# Patient Record
Sex: FEMALE | Race: WHITE
[De-identification: names, ages, dates, MRNs, and addresses within clinical notes are randomized per-mention and may not be internally consistent; named-entity substitution may affect disease eponyms.]

---

## 2019-10-01 ENCOUNTER — HOSPITAL ENCOUNTER (EMERGENCY)
Dept: HOSPITAL 92 - ERS | Age: 34
Discharge: HOME | End: 2019-10-01
Payer: COMMERCIAL

## 2019-10-01 DIAGNOSIS — F17.210: ICD-10-CM

## 2019-10-01 DIAGNOSIS — M54.41: Primary | ICD-10-CM

## 2019-10-01 DIAGNOSIS — Z79.899: ICD-10-CM

## 2019-10-01 LAB
PREGU CONTROL BACKGROUND?: (no result)
PREGU CONTROL BAR APPEAR?: YES
RBC UR QL AUTO: (no result) HPF (ref 0–3)
WBC UR QL AUTO: (no result) HPF (ref 0–3)

## 2019-10-01 PROCEDURE — 96361 HYDRATE IV INFUSION ADD-ON: CPT

## 2019-10-01 PROCEDURE — 81003 URINALYSIS AUTO W/O SCOPE: CPT

## 2019-10-01 PROCEDURE — 96374 THER/PROPH/DIAG INJ IV PUSH: CPT

## 2019-10-01 PROCEDURE — 96376 TX/PRO/DX INJ SAME DRUG ADON: CPT

## 2019-10-01 PROCEDURE — 96375 TX/PRO/DX INJ NEW DRUG ADDON: CPT

## 2019-10-01 PROCEDURE — 74176 CT ABD & PELVIS W/O CONTRAST: CPT

## 2019-10-01 PROCEDURE — 81015 MICROSCOPIC EXAM OF URINE: CPT

## 2019-10-01 PROCEDURE — 72131 CT LUMBAR SPINE W/O DYE: CPT

## 2019-10-01 PROCEDURE — 81025 URINE PREGNANCY TEST: CPT

## 2019-10-01 NOTE — CT
PRELIMINARY REPORT/[facility]/EMERGENCY AFTER HOURS PROCEDURE:

 

PROCEDURE INFORMATION:

Exam: CT Abdomen and pelvis without contrast

 

Exam date and time: 10/1/2019 2:15 AM

Clinical history: 33 years old, female; Patient HX: F33 presents to ED with C/O lower back pain that 
radiates down her R leg, onset 1400 today with associated nausea. Patient has chronic back pain, but 
reports that this pain is different and it feels like something is pinched. Patient reports that the 
pain worsened when she went to lie down. Denies injury or falls. Patient has 2 rods in her back from 
surgery for scoliosis. Reports that she had a baby 4 months ago by vaginal delivery and was stuck for
 her epidural 9 times. Patient had an mri done 3 weeks ago and told she had degenerative disc disease
 for which she will need cortisol shots. Denies bowel or bladder incontinence. Denies vaginal bleedin
g and discharge. Denies fever, vomiting and abdominal pain

 

TECHNIQUE:

Imaging protocol: Computed tomography of the abdomen and pelvis without contrast.

 

COMPARISON:

No relevant prior studies available.

 

FINDINGS:

Liver: Normal. No mass.

Gallbladder and bile ducts: Normal. No calcified stones. No ductal dilation.

Pancreas: Normal. No ductal dilation.

Spleen: Normal. No splenomegaly.

Adrenals: Normal. No mass.

Kidneys and ureters: Normal. No hydronephrosis.

Stomach and bowel: There are prominent small bowel loops of the left upper quadrant with tapering. Th
is could be due to peristalsis. Please correlate.

Appendix: No evidence of appendicitis.

Intraperitoneal space: Unremarkable. No free air. No significant fluid collection.

Vasculature: Unremarkable. No abdominal aortic aneurysm.

Lymph nodes: Unremarkable. No enlarged lymph nodes.

Bladder: Unremarkable as visualized.

Reproductive: Unremarkable as visualized.

Bones/joints: Postoperative changes of the thoracolumbar spine with Cross rods for scoliosis.

There is scoliosis of the lumbar spine with convexity to the left.

Soft tissues: Bilateral breast implants.

 

IMPRESSION:

1. Prominent small bowel loops of left upper quadrant with tapering probably due to peristalsis. Plea
se correlate.

2. No acute appendicitis.

3. No renal stones or hydronephrosis.

 

Thank you for allowing us to participate in the care of your patient.

Dictated and Authenticated by: Shannan Kelley DO

10/01/2019 2:59 AM Central Time ([place] & [place])

 

 

 

FINAL REPORT 

 

CT ABDOMEN AND PELVIS NONCONTRAST:

 

DATE: 10/1/2019.

TIME: Performed on an emergency basis at 0216 hours.

 

HISTORY: 

Left flank pain.

 

FINDINGS: 

No comparison.  Agree with the preliminary report by Dr. Kelley from [facility].  There is no 
CT evidence of urinary tract obstruction or calcification.  Lack of contrast limits evaluation for ot
her abnormalities.  Gas and fluid-distended loops of proximal small bowel are noted but are of doubtf
ul clinical significance.  Overall appearance is not suggestive of significant obstruction.

 

Detailed limited somewhat due to extensive metallic spray artifact from posterior spinal rods.

 

POS: [facility]

## 2019-10-01 NOTE — CT
PRELIMINARY REPORT/[facility]/EMERGENCY AFTER

HOURS PROCEDURE: 

 

PROCEDURE INFORMATION:

Exam: CT Lumbar Spine Without Contrast

 

Exam date and time: 10/1/2019 2:15 AM

 

Clinical history: 33 years old, female; Low back pain; Patient HX: F34 presents to ED with C/O lower 
back pain that radiates down her R leg, onset 1400 today with associated nausea. Patient has chronic 
back pain, but reports that this pain is different and it feels like something is pinched. Patient re
ports that the pain worsened when she went to lie down. Denies injury or falls. Patient has 2 rods in
 her back from surgery for scoliosis. Reports that she had a baby 4 months ago by vaginal delivery an
d was stuck for her epidural 9 times. Patient had an mri done 3 weeks ago and told she had degenerati
ve disc disease for which she will need cortisol shots. Denies bowel or bladder incontinence. Denies 
vaginal bleeding and discharge. Denies fever, vomiting and abdominal pain

 

TECHNIQUE:

Imaging protocol: Computed tomography images of the lumbar spine without contrast.

 

COMPARISON:

No relevant prior studies available.

 

FINDINGS:

Vertebrae: There are postoperative changes of the thoracolumbar spine with Cross rods for scolio
sis. There is scoliosis of the before spine with convexity to the left. Bilateral pars defects at L5-
S1. No acute fracture.

Discs/Spinal canal/Neural foramina: No spinal stenosis. No neural foraminal narrowing.

Soft tissues: Unremarkable.

 

 

 

IMPRESSION:

No acute process.

 

Thank you for allowing us to participate in the care of your patient.

Dictated and Authenticated by: Shannan Kelley DO

10/01/2019 3:04 AM Central Time ([place] & [place])

 

 

 

FINAL REPORT 

 

CT LUMBAR SPINE PERFORMED ON AN EMERGENCY BASIS:

 

Date:  10/01/19 

Time:  0216 hours

 

HISTORY:  

Back pain. Prior surgery. 

 

FINDINGS:

Findings agree with the preliminary report by [facility]. Posterior operative fixation and long thoracolumb
ar vertical rods. No significant compromise of the central canal is apparent. Bilateral pars interart
icularis defects/spondylolysis evident at the lumbosacral junction without significant spondylolisthe
sis. 

 

 

 

POS: [facility]